# Patient Record
Sex: MALE | Race: WHITE
[De-identification: names, ages, dates, MRNs, and addresses within clinical notes are randomized per-mention and may not be internally consistent; named-entity substitution may affect disease eponyms.]

---

## 2018-02-10 ENCOUNTER — HOSPITAL ENCOUNTER (EMERGENCY)
Dept: HOSPITAL 17 - PHEFT | Age: 83
Discharge: HOME | End: 2018-02-10
Payer: MEDICARE

## 2018-02-10 VITALS
SYSTOLIC BLOOD PRESSURE: 141 MMHG | DIASTOLIC BLOOD PRESSURE: 63 MMHG | OXYGEN SATURATION: 95 % | RESPIRATION RATE: 16 BRPM | TEMPERATURE: 98.4 F | HEART RATE: 92 BPM

## 2018-02-10 VITALS — HEIGHT: 68 IN | BODY MASS INDEX: 23.72 KG/M2 | WEIGHT: 156.53 LBS

## 2018-02-10 DIAGNOSIS — Z79.82: ICD-10-CM

## 2018-02-10 DIAGNOSIS — S42.124A: Primary | ICD-10-CM
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
## 2018-02-10 PROCEDURE — 99283 EMERGENCY DEPT VISIT LOW MDM: CPT

## 2018-02-10 PROCEDURE — 73030 X-RAY EXAM OF SHOULDER: CPT

## 2018-02-10 NOTE — RADRPT
EXAM DATE/TIME:  02/10/2018 10:34 

 

[facility] COMPARISON:     

No previous studies available for comparison.

 

                     

INDICATIONS :     

Fell, right shoulder pain, limited ROM, unable to lift arm

                     

 

MEDICAL HISTORY :            

Polio   

 

SURGICAL HISTORY :     

None.   

 

ENCOUNTER:     

Initial                                        

 

ACUITY:     

1 day      

 

PAIN SCORE:     

5/10

 

LOCATION:     

Right  shoulder

 

FINDINGS:     

Multiple views of the right shoulder demonstrate a nondisplaced fracture involving the acromion. Mode
rate adjacent soft tissue edema. There is normal glenohumeral alignment. The bones are normal in mine
ralization.

 

 

CONCLUSION:     

Nondisplaced fracture of the acromion.

 

 

 

 Lucretia Armstrong MD on February 10, 2018 at 10:59           

Board Certified Radiologist.

 This report was verified electronically.

## 2018-02-10 NOTE — PD
HPI


Chief Complaint:  Injury


Time Seen by Provider:  10:01


Travel History


International Travel<30 days:  No


Contact w/Intl Traveler<30days:  No


Traveled to known affect area:  No





History of Present Illness


HPI


86-year-old male presents to the emergency department for evaluation of right 

shoulder injury that occurred during a fall yesterday morning.  Patient states 

he was getting out of the shower was holding onto the towel bar when the towel 

bar broke causing him to fall.  Patient denies any head injury or LOC.  No neck 

pain or back pain.  No chest pain or abdominal pain.  No nausea, vomiting, 

diarrhea.  He has been ambulatory since the fall.  He denies being on 

anticoagulants.  Movement of the right shoulder will exacerbate pain.  Moderate 

severity.  No alleviating factors.





PFSH


Social History


Alcohol Use:  No


Tobacco Use:  No


Substance Use:  No





Allergies-Medications


(Allergen,Severity, Reaction):  


Coded Allergies:  


     No Known Allergies (Unverified , 2/10/18)


Reported Meds & Prescriptions





Reported Meds & Active Scripts


Active


Reported


Calcium 600 (Calcium Carbonate) 600 Mg Calcium (1500 Mg) Tab 600 Mg PO DAILY


Vitamin D-1000 (Cholecalciferol) 1,000 Unit Tab 1,000 Units PO DAILY


Aspirin EC (Aspirin) 81 Mg Tabdr 81 Mg PO DAILY


Tribenzor (Olmesartan-Amlodipine-Hydrochlorothiazide) 40-10-25 mg Tab 1 Tab PO 

DAILY


Simvastatin 40 Mg Tab 40 Mg PO HS


Omeprazole 20 Mg Tab 20 Mg PO DAILY








Review of Systems


Except as stated in HPI:  all other systems reviewed are Neg





Physical Exam


Narrative


GENERAL: Well-nourished, well-developed elderly male patient, afebrile.


SKIN: Focused skin assessment warm/dry.


HEAD: Normocephalic.  Atraumatic.


ENT: Mucosa pink and moist. No erythema or exudates. No uvular edema. No uvular

, palatal, or tonsillar deviation. Airway patent. Nasal turbinates appear 

normal without nasal blood, purulent drainage or septal hematoma.  Bilateral 

tympanic membranes are clear without erythema or perforation.


EYES: No scleral icterus. No injection or drainage. 


NECK: Supple, trachea midline. No JVD or lymphadenopathy.


CARDIOVASCULAR: Regular rate and rhythm without murmurs, gallops, or rubs.  

Right radial pulses 2+.


RESPIRATORY: Breath sounds equal bilaterally. No accessory muscle use.  Lungs 

sounds are clear to auscultation.


GASTROINTESTINAL: Abdomen soft, non-tender, nondistended. 


MUSCULOSKELETAL: No cyanosis, or edema.  Patient has tenderness over right 

humeral head.  He has reduced range of motion due to pain.


BACK: Nontender without obvious deformity. No CVA tenderness.





Data


Data


Last Documented VS





Vital Signs








  Date Time  Temp Pulse Resp B/P (MAP) Pulse Ox O2 Delivery O2 Flow Rate FiO2


 


2/10/18 09:37 98.4 92 16 141/63 (89) 95   








Orders





 Orders


Shoulder, Complete (>2vws) (2/10/18 09:40)


Acetamin-Hydrocod 325-5 Mg (Norco  5-325 (2/10/18 10:15)








MDM


Medical Decision Making


Medical Screen Exam Complete:  Yes


Emergency Medical Condition:  Yes


Medical Record Reviewed:  Yes


Interpretation(s)


x-ray right shoulder - CONCLUSION:     


Nondisplaced fracture of the acromion.


Differential Diagnosis


Fracture versus dislocation versus contusion versus sprain


Narrative Course


86-year-old presents to the emergency department for evaluation of right 

shoulder injury that occurred yesterday morning when he fell.  X-ray of the 

right shoulder is ordered and pending.  Patient is given Norco 5/325 mg by 

mouth for pain.





X-ray of the right shoulder shows a nondisplaced fracture of the acromion.





Patient is placed in a sling.  He is instructed to follow-up with an 

orthopedist.  He'll be given the name and number for orthopedist on-call today.

  He will be given a short-term prescription for Lortab for pain.  He 

verbalizes agreement and understanding.





The patient was discharged in stable condition with instructions, including 

return instructions and follow up instructions.





Diagnosis





 Primary Impression:  


 Nondisplaced fracture of right acromial process


 Qualified Codes:  S42.124A - Nondisplaced fracture of acromial process, right 

shoulder, initial encounter for closed fracture


Referrals:  


Marycruz Leigh MD


call for appointment





Orthopedist


call for appointment


Patient Instructions:  Arm Fracture in Adults (ED), General Instructions





***Additional Instructions:  


Wear sling.


Ice for 20 minutes 4-5 times daily.


Take Norco as directed as needed for pain.  Caution this can make you drowsy so 

do not drive after taking.


Follow-up with an orthopedist.  Dr. Leigh is our orthopedist on call today.


Return to the emergency department for any acute worsening of symptoms.


***Med/Other Pt SpecificInfo:  Prescription(s) given


Scripts


Hydrocodone-Acetaminophen (Norco) 5 Mg-325 Mg Tab


1 TAB PO Q6H Y for PAIN, #12 TAB 0 Refills


   Prov: Bryanna Hamilton         2/10/18


Disposition:  01 DISCHARGE HOME


Condition:  Stable











Bryanna Hamilton Feb 10, 2018 10:11

## 2018-02-19 ENCOUNTER — HOSPITAL ENCOUNTER (EMERGENCY)
Dept: HOSPITAL 17 - PHEFT | Age: 83
Discharge: HOME | End: 2018-02-19
Payer: MEDICARE

## 2018-02-19 VITALS
OXYGEN SATURATION: 96 % | SYSTOLIC BLOOD PRESSURE: 139 MMHG | RESPIRATION RATE: 17 BRPM | HEART RATE: 79 BPM | TEMPERATURE: 98.5 F | DIASTOLIC BLOOD PRESSURE: 66 MMHG

## 2018-02-19 VITALS — WEIGHT: 156.53 LBS | HEIGHT: 68 IN | BODY MASS INDEX: 23.72 KG/M2

## 2018-02-19 DIAGNOSIS — E78.00: ICD-10-CM

## 2018-02-19 DIAGNOSIS — M25.531: Primary | ICD-10-CM

## 2018-02-19 DIAGNOSIS — I10: ICD-10-CM

## 2018-02-19 DIAGNOSIS — Z86.12: ICD-10-CM

## 2018-02-19 DIAGNOSIS — K21.9: ICD-10-CM

## 2018-02-19 PROCEDURE — 73110 X-RAY EXAM OF WRIST: CPT

## 2018-02-19 PROCEDURE — 99283 EMERGENCY DEPT VISIT LOW MDM: CPT

## 2018-02-19 NOTE — PD
HPI


Chief Complaint:  Pain: Acute or Chronic


Time Seen by Provider:  18:00


Travel History


International Travel<30 days:  No


Contact w/Intl Traveler<30days:  No


Traveled to known affect area:  No





History of Present Illness


HPI


Patient comes to the emergency department complaining of right wrist pain on 

the ulnar aspect described as an irritation.  Patient reports this started 

approximately 2-3 days after wearing a sling from a right shoulder fracture.  

Patient denies anything making this better or worse.  Patient has tried using 

ice and heat with no improvement of symptoms.  Denies any fevers or radiation 

of the pain.  Denies any known trauma.  Denies any numbness or tingling or 

decreased range of motion of the ordinary.  Patient does report some decreased 

range of motion with his right thumb secondary to polio.





PFSH


Past Medical History


High Cholesterol:  Yes


Diminished Hearing:  No


GERD:  Yes


Hypertension:  Yes


Musculoskeletal:  Yes (POLIO)


Pregnant?:  Not Pregnant





Past Surgical History


Abdominal Surgery:  Yes (HERNIA)





Social History


Alcohol Use:  No


Tobacco Use:  No


Substance Use:  No





Allergies-Medications


(Allergen,Severity, Reaction):  


Coded Allergies:  


     No Known Allergies (Unverified , 2/19/18)


Reported Meds & Prescriptions





Reported Meds & Active Scripts


Active


Norco (Hydrocodone-Acetaminophen) 5 Mg-325 Mg Tab 1 Tab PO Q6H PRN


Reported


Calcium 600 (Calcium Carbonate) 600 Mg Calcium (1500 Mg) Tab 600 Mg PO DAILY


Vitamin D-1000 (Cholecalciferol) 1,000 Unit Tab 1,000 Units PO DAILY


Aspirin EC (Aspirin) 81 Mg Tabdr 81 Mg PO DAILY


Tribenzor (Olmesartan-Amlodipine-Hydrochlorothiazide) 40-10-25 mg Tab 1 Tab PO 

DAILY


Simvastatin 40 Mg Tab 40 Mg PO HS


Omeprazole 20 Mg Tab 20 Mg PO DAILY








Review of Systems


Except as stated in HPI:  all other systems reviewed are Neg





Physical Exam


Narrative


GENERAL: Well-developed, well nourished, in no acute distress, and non-ill 

appearing.


SKIN: Focused skin assessment warm and dry.  Patient reports tenderness 

palpation over ulnar aspect of the right wrist.  Neurovascularly intact 

distally.  Reports range of motion is normal for him.  Capillary refill less 

than 2 seconds.  No crepitus, induration, or erythematous.  No signs of 

infection.


HEAD: Atraumatic. Normocephalic. 


EYES: Pupils equal and round. EOMI. No scleral icterus. No injection or 

drainage. 


ENT: No nasal bleeding or discharge.  Mucous membranes pink and moist.


NECK: Trachea midline. Supple.  No nuclear rigidity.


CARDIOVASCULAR: Radial pulses 2+, intact, and equal bilaterally.  Capillary 

refill less than 2 seconds.


RESPIRATORY: No accessory muscle use.  No respiratory distress. 


MUSCULOSKELETAL: No obvious deformities. No clubbing.  No cyanosis.  No edema.  

Decreased range of motion right shoulder secondary to recent fracture.


NEUROLOGICAL: Awake and alert. No obvious cranial nerve deficits.  Motor 

grossly within normal limits. Normal speech.


PSYCHIATRIC: Appropriate mood and affect; insight and judgment normal.





Data


Data


Last Documented VS





Vital Signs








  Date Time  Temp Pulse Resp B/P (MAP) Pulse Ox O2 Delivery O2 Flow Rate FiO2


 


2/19/18 17:39 98.5 79 17 139/66 (90) 96   








Orders





 Orders


Wrist, Complete ([id number]) (2/19/18 )


Ed Discharge Order (2/19/18 19:16)








MDM


Medical Decision Making


Medical Screen Exam Complete:  Yes


Emergency Medical Condition:  Yes


Interpretation(s)





Last Impressions








Wrist X-Ray 2/19/18 0000 Signed





Impressions: 





 Service Date/Time:  Monday, February 19, 2018 18:37 - CONCLUSION:  Arthritic 





 changes and mild erosions involving the wrist as described. No acute bony 





 findings     Daniel Rock MD 








Differential Diagnosis


Fracture, strain, contusion, irritation


Narrative Course


There is no clinical evidence for fracture. There is no clinical evidence to 

suspect bony injury by exam. Radiographic examination revealed no fracture seen 

at this time. No obvious ligamental injury or internal derangement is noted at 

this time. The distal extremity appears neurovascularly intact, without 

evidence of neurovascular injury nor compartment syndrome. Tendon exam also was 

intact. The patient was discharged and given warnings for vascular compromise. 

The patient is to follow up with primary care provider and/or orthopedics. The 

patient agrees with plan.





Patient in no obvious distress upon re-evaluation. All pertinent Radiology 

result(s) discussed with patient/family. Any questions/concerns in reference to 

patient diagnosis/condition discussed and clarified prior to patient's 

discharge. Reinforced sheer importance of close follow up with patient's 

primary physician or primary care clinic and/or orthopedic. Instructed patient 

to return to ED immediately, if symptoms return/worsen. Patient showed 

understanding of above instructions.  Further instructions and recommendations 

were detailed in discharge paperwork.  Patient ambulated without difficulty out 

of ED at discharge.





Diagnosis





 Primary Impression:  


 Right wrist pain


Patient Instructions:  Arthralgia (ED), General Instructions





***Additional Instructions:  


Follow-up with your primary care physician and/or orthopedics this week for 

reevaluation.  Put some padding between your wrist and the sling.  Return to 

the emergency department if symptoms get worse.


Disposition:  01 DISCHARGE HOME


Condition:  Stable











Jeffrey Ayala Feb 19, 2018 18:07

## 2018-02-19 NOTE — RADRPT
EXAM DATE/TIME:  02/19/2018 18:37 

 

[facility] COMPARISON:     

No previous studies available for comparison.

 

                     

INDICATIONS :     

Complains of right wrist pain and swelling.  Patient states hurt right shoulder last week and now com
plains of wrist pain.

                     

 

MEDICAL HISTORY :     

None.          

 

SURGICAL HISTORY :     

None.   

 

ENCOUNTER:     

Initial                                        

 

ACUITY:     

1 week      

 

PAIN SCORE:     

8/10

 

LOCATION:     

Right  wrist

 

FINDINGS:     

Chondrocalcinosis is noted. There is a tiny erosion involving the tip of the ulnar styloid and sclero
tiana change in the lunate at the scapholunate articulation. The bony elements are otherwise intact. T
here is no evidence of fracture or dislocation.

 

CONCLUSION:     

Arthritic changes and mild erosions involving the wrist as described. No acute bony findings

 

 

 

 Daniel Rock MD on February 19, 2018 at 19:09           

Board Certified Radiologist.

 This report was verified electronically.

## 2018-02-24 ENCOUNTER — HOSPITAL ENCOUNTER (INPATIENT)
Dept: HOSPITAL 17 - PHED | Age: 83
LOS: 3 days | Discharge: HOME HEALTH | DRG: 603 | End: 2018-02-27
Attending: HOSPITALIST | Admitting: HOSPITALIST
Payer: MEDICARE

## 2018-02-24 VITALS
RESPIRATION RATE: 18 BRPM | SYSTOLIC BLOOD PRESSURE: 137 MMHG | HEART RATE: 83 BPM | OXYGEN SATURATION: 96 % | DIASTOLIC BLOOD PRESSURE: 62 MMHG

## 2018-02-24 VITALS
SYSTOLIC BLOOD PRESSURE: 157 MMHG | TEMPERATURE: 98.1 F | OXYGEN SATURATION: 96 % | HEART RATE: 90 BPM | RESPIRATION RATE: 16 BRPM | DIASTOLIC BLOOD PRESSURE: 67 MMHG

## 2018-02-24 VITALS — OXYGEN SATURATION: 96 %

## 2018-02-24 VITALS — OXYGEN SATURATION: 93 %

## 2018-02-24 VITALS — HEIGHT: 68 IN | BODY MASS INDEX: 23.36 KG/M2 | WEIGHT: 154.1 LBS

## 2018-02-24 VITALS
DIASTOLIC BLOOD PRESSURE: 66 MMHG | TEMPERATURE: 97 F | OXYGEN SATURATION: 96 % | RESPIRATION RATE: 18 BRPM | SYSTOLIC BLOOD PRESSURE: 137 MMHG | HEART RATE: 80 BPM

## 2018-02-24 VITALS
TEMPERATURE: 98.4 F | OXYGEN SATURATION: 93 % | HEART RATE: 79 BPM | RESPIRATION RATE: 20 BRPM | SYSTOLIC BLOOD PRESSURE: 130 MMHG | DIASTOLIC BLOOD PRESSURE: 62 MMHG

## 2018-02-24 VITALS
HEART RATE: 75 BPM | SYSTOLIC BLOOD PRESSURE: 112 MMHG | DIASTOLIC BLOOD PRESSURE: 73 MMHG | TEMPERATURE: 98.1 F | RESPIRATION RATE: 16 BRPM | OXYGEN SATURATION: 95 %

## 2018-02-24 VITALS — HEART RATE: 80 BPM

## 2018-02-24 DIAGNOSIS — Z86.12: ICD-10-CM

## 2018-02-24 DIAGNOSIS — E78.5: ICD-10-CM

## 2018-02-24 DIAGNOSIS — K21.9: ICD-10-CM

## 2018-02-24 DIAGNOSIS — L03.031: Primary | ICD-10-CM

## 2018-02-24 DIAGNOSIS — Z86.19: ICD-10-CM

## 2018-02-24 DIAGNOSIS — I10: ICD-10-CM

## 2018-02-24 LAB
BASOPHILS # BLD AUTO: 0.1 TH/MM3 (ref 0–0.2)
BASOPHILS NFR BLD: 0.4 % (ref 0–2)
BUN SERPL-MCNC: 20 MG/DL (ref 7–18)
CALCIUM SERPL-MCNC: 9.3 MG/DL (ref 8.5–10.1)
CHLORIDE SERPL-SCNC: 102 MEQ/L (ref 98–107)
CREAT SERPL-MCNC: 1.2 MG/DL (ref 0.6–1.3)
EOSINOPHIL # BLD: 0.1 TH/MM3 (ref 0–0.4)
EOSINOPHIL NFR BLD: 0.8 % (ref 0–4)
ERYTHROCYTE [DISTWIDTH] IN BLOOD BY AUTOMATED COUNT: 12.2 % (ref 11.6–17.2)
GFR SERPLBLD BASED ON 1.73 SQ M-ARVRAT: 57 ML/MIN (ref 89–?)
GLUCOSE SERPL-MCNC: 119 MG/DL (ref 74–106)
HCO3 BLD-SCNC: 23.2 MEQ/L (ref 21–32)
HCT VFR BLD CALC: 40.5 % (ref 39–51)
HGB BLD-MCNC: 13.6 GM/DL (ref 13–17)
LYMPHOCYTES # BLD AUTO: 2.3 TH/MM3 (ref 1–4.8)
LYMPHOCYTES NFR BLD AUTO: 14.2 % (ref 9–44)
MCH RBC QN AUTO: 29.1 PG (ref 27–34)
MCHC RBC AUTO-ENTMCNC: 33.5 % (ref 32–36)
MCV RBC AUTO: 86.7 FL (ref 80–100)
MONOCYTE #: 1.3 TH/MM3 (ref 0–0.9)
MONOCYTES NFR BLD: 8.3 % (ref 0–8)
NEUTROPHILS # BLD AUTO: 12.2 TH/MM3 (ref 1.8–7.7)
NEUTROPHILS NFR BLD AUTO: 76.3 % (ref 16–70)
PLATELET # BLD: 273 TH/MM3 (ref 150–450)
PMV BLD AUTO: 7.7 FL (ref 7–11)
RBC # BLD AUTO: 4.67 MIL/MM3 (ref 4.5–5.9)
SODIUM SERPL-SCNC: 136 MEQ/L (ref 136–145)
WBC # BLD AUTO: 16 TH/MM3 (ref 4–11)

## 2018-02-24 PROCEDURE — 83036 HEMOGLOBIN GLYCOSYLATED A1C: CPT

## 2018-02-24 PROCEDURE — 96372 THER/PROPH/DIAG INJ SC/IM: CPT

## 2018-02-24 PROCEDURE — 96365 THER/PROPH/DIAG IV INF INIT: CPT

## 2018-02-24 PROCEDURE — 85025 COMPLETE CBC W/AUTO DIFF WBC: CPT

## 2018-02-24 PROCEDURE — G0378 HOSPITAL OBSERVATION PER HR: HCPCS

## 2018-02-24 PROCEDURE — 73720 MRI LWR EXTREMITY W/O&W/DYE: CPT

## 2018-02-24 PROCEDURE — 87185 SC STD ENZYME DETCJ PER NZM: CPT

## 2018-02-24 PROCEDURE — 87205 SMEAR GRAM STAIN: CPT

## 2018-02-24 PROCEDURE — 96375 TX/PRO/DX INJ NEW DRUG ADDON: CPT

## 2018-02-24 PROCEDURE — 87077 CULTURE AEROBIC IDENTIFY: CPT

## 2018-02-24 PROCEDURE — A9579 GAD-BASE MR CONTRAST NOS,1ML: HCPCS

## 2018-02-24 PROCEDURE — 82948 REAGENT STRIP/BLOOD GLUCOSE: CPT

## 2018-02-24 PROCEDURE — 80048 BASIC METABOLIC PNL TOTAL CA: CPT

## 2018-02-24 PROCEDURE — 87070 CULTURE OTHR SPECIMN AEROBIC: CPT

## 2018-02-24 PROCEDURE — 83605 ASSAY OF LACTIC ACID: CPT

## 2018-02-24 PROCEDURE — L3260 AMBULATORY SURGICAL BOOT EAC: HCPCS

## 2018-02-24 PROCEDURE — 87186 SC STD MICRODIL/AGAR DIL: CPT

## 2018-02-24 PROCEDURE — 73630 X-RAY EXAM OF FOOT: CPT

## 2018-02-24 PROCEDURE — 82565 ASSAY OF CREATININE: CPT

## 2018-02-24 RX ADMIN — HYDROCODONE BITARTRATE AND ACETAMINOPHEN PRN TAB: 5; 325 TABLET ORAL at 22:48

## 2018-02-24 RX ADMIN — ENOXAPARIN SODIUM SCH MG: 40 INJECTION SUBCUTANEOUS at 15:13

## 2018-02-24 RX ADMIN — HYDROCODONE BITARTRATE AND ACETAMINOPHEN PRN TAB: 5; 325 TABLET ORAL at 16:48

## 2018-02-24 RX ADMIN — STANDARDIZED SENNA CONCENTRATE AND DOCUSATE SODIUM SCH TAB: 8.6; 5 TABLET, FILM COATED ORAL at 21:00

## 2018-02-24 RX ADMIN — PRAVASTATIN SODIUM SCH MG: 80 TABLET ORAL at 21:53

## 2018-02-24 RX ADMIN — INSULIN ASPART SCH: 100 INJECTION, SOLUTION INTRAVENOUS; SUBCUTANEOUS at 21:00

## 2018-02-24 NOTE — PD
HPI


.


Skin infection


Chief Complaint:  Skin Problem


Time Seen by Provider:  11:13


Travel History


International Travel<30 days:  No


Contact w/Intl Traveler<30days:  No


Traveled to known affect area:  No





History of Present Illness


HPI


This patient presents with a chief complaint of infected right fourth and fifth 

toes.  He states that his symptoms all started about 3 weeks ago with some mild 

discomfort between his toes.  He called his primary care doctor who is in 

another state no stem presumptively with athlete's foot and put him on an 

antifungal cream.  He used that for a while but his symptoms got worse rather 

than better.  He was started on doxycycline 3 days ago for cellulitis.  Despite 

that his symptoms have gotten worse rather than better since that time causing 

him to present to us today.  He reports no systemic symptoms such as fever or 

nausea.  He does report some localized swelling.  He reports mild pain which 

she rates 2/10.





PFSH


Past Medical History


High Cholesterol:  Yes


Diminished Hearing:  No


GERD:  Yes


Hypertension:  Yes


Musculoskeletal:  Yes (POLIO)


Tetanus Vaccination:  < 5 Years


Influenza Vaccination:  Yes


Pregnant?:  Not Pregnant





Past Surgical History


Abdominal Surgery:  Yes (HERNIA)





Social History


Alcohol Use:  No


Tobacco Use:  No


Substance Use:  No





Allergies-Medications


(Allergen,Severity, Reaction):  


Coded Allergies:  


     No Known Allergies (Unverified , 2/24/18)


Reported Meds & Prescriptions





Reported Meds & Active Scripts


Active


Reported


Doxycycline Hyclate 100 Mg Cap 100 Mg PO BID


Calcium 600 (Calcium Carbonate) 600 Mg Calcium (1500 Mg) Tab 600 Mg PO DAILY


Vitamin D-1000 (Cholecalciferol) 1,000 Unit Tab 1,000 Units PO DAILY


Aspirin EC (Aspirin) 81 Mg Tabdr 81 Mg PO DAILY


Tribenzor (Olmesartan-Amlodipine-Hydrochlorothiazide) 40-10-25 mg Tab 1 Tab PO 

DAILY


Simvastatin 40 Mg Tab 40 Mg PO HS


Omeprazole 20 Mg Tab 20 Mg PO DAILY








Review of Systems


Except as stated in HPI:  all other systems reviewed are Neg


General / Constitutional:  No: Fever, Chills


Skin:  Positive Change in Pigmentation





Physical Exam


Narrative


GENERAL: Awake and alert and in no acute distress.


SKIN: Warm and dry.  He has denuded tissue between the 3rd & 4th and 4th and 

5th toes of the right foot.  There appears to be some purulent drainage.  There 

is localized tenderness.


HEAD: Normocephalic/atraumatic.


EYES: Pupils are equal.  Extraocular movements are intact.


NECK: Normal range of motion.


RESPIRATORY: Nonlabored respirations.


MUSCULOSKELETAL: Atraumatic.


NEUROLOGICAL: Nonfocal.


PSYCHIATRIC: Appropriate mood and affect.





Data


Data


Last Documented VS





Vital Signs








  Date Time  Temp Pulse Resp B/P (MAP) Pulse Ox O2 Delivery O2 Flow Rate FiO2


 


2/24/18 12:22  83 18 137/62 (87) 96 Room Air  


 


2/24/18 09:15 98.1       








Orders





 Orders


Foot, Complete ([id number]) (2/24/18 )


Complete Blood Count With Diff (2/24/18 11:13)


Lactic Acid Sepsis Protocol (2/24/18 11:13)


Wound Culture And Gram Stain (2/24/18 11:13)


Basic Metabolic Panel (Bmp) (2/24/18 11:13)


Iv Access Insert/Monitor (2/24/18 11:13)


Vancomycin Inj (Vancomycin Inj) (2/24/18 11:45)


Admit Order (Ed Use Only) (2/24/18 )


Vital Signs (Adult) Q4H (2/24/18 13:25)


Diet Heart Healthy (2/24/18 Lunch)


Activity Oob With Assistance (2/24/18 13:25)


Notify Dr: Other (2/24/18 13:25)





Labs





Laboratory Tests








Test


  2/24/18


11:25


 


White Blood Count 16.0 TH/MM3 


 


Red Blood Count 4.67 MIL/MM3 


 


Hemoglobin 13.6 GM/DL 


 


Hematocrit 40.5 % 


 


Mean Corpuscular Volume 86.7 FL 


 


Mean Corpuscular Hemoglobin 29.1 PG 


 


Mean Corpuscular Hemoglobin


Concent 33.5 % 


 


 


Red Cell Distribution Width 12.2 % 


 


Platelet Count 273 TH/MM3 


 


Mean Platelet Volume 7.7 FL 


 


Neutrophils (%) (Auto) 76.3 % 


 


Lymphocytes (%) (Auto) 14.2 % 


 


Monocytes (%) (Auto) 8.3 % 


 


Eosinophils (%) (Auto) 0.8 % 


 


Basophils (%) (Auto) 0.4 % 


 


Neutrophils # (Auto) 12.2 TH/MM3 


 


Lymphocytes # (Auto) 2.3 TH/MM3 


 


Monocytes # (Auto) 1.3 TH/MM3 


 


Eosinophils # (Auto) 0.1 TH/MM3 


 


Basophils # (Auto) 0.1 TH/MM3 


 


CBC Comment DIFF FINAL 


 


Differential Comment  


 


Blood Urea Nitrogen 20 MG/DL 


 


Creatinine 1.20 MG/DL 


 


Random Glucose 119 MG/DL 


 


Calcium Level 9.3 MG/DL 


 


Sodium Level 136 MEQ/L 


 


Potassium Level 3.7 MEQ/L 


 


Chloride Level 102 MEQ/L 


 


Carbon Dioxide Level 23.2 MEQ/L 


 


Anion Gap 11 MEQ/L 


 


Estimat Glomerular Filtration


Rate 57 ML/MIN 


 


 


Lactic Acid Level 2.0 mmol/L 











MDM


Medical Decision Making


Medical Screen Exam Complete:  Yes


Emergency Medical Condition:  Yes


Medical Record Reviewed:  Yes (the patient has only had sporadic visits to us 

mainly for orthopedic injuries.)


Differential Diagnosis


My differential diagnosis includes but is not limited to localized wound 

infection, cellulitis, abscess


Narrative Course


This patient presents with worsening cellulitis of the right fourth and fifth 

toes.  His symptoms have been ongoing for 3 weeks and were initially attributed 

to athlete's foot.  He was started on antibiotics 3 days ago but has gotten 

worse rather than better.  I will give him a dose of vancomycin.  An x-ray of 

the foot is pending to look for osteomyelitis.  CBC and lactic acid are also 

pending to rule out systemic infection.





CBC & BMP Diagram


2/24/18 11:25








Calcium Level 9.3





LA 2.0








Last Impressions








Foot X-Ray 2/24/18 0000 Signed





Impressions: 





 Service Date/Time:  Saturday, February 24, 2018 11:43 - CONCLUSION:  





 Unremarkable exam for patient's age.     Sylvester Mares MD 








This patient will be admitted for failed outpatient therapy of cellulitis.





Physician Communication


Physician Communication


Dr. Castillo





Diagnosis





 Primary Impression:  


 Cellulitis


 Qualified Codes:  L03.031 - Cellulitis of right toe





Admitting Information


Admitting Physician Requests:  Admit


Condition:  Stable











Pinky Quinones MD Feb 24, 2018 12:02

## 2018-02-24 NOTE — RADRPT
EXAM DATE/TIME:  02/24/2018 11:43 

 

[facility] COMPARISON:     

No previous studies available for comparison.

 

                     

INDICATIONS :     

Complains of pain and redness of 4th and 5th digits of right foot.

                     

 

MEDICAL HISTORY :     

None.          

 

SURGICAL HISTORY :     

None.   

 

ENCOUNTER:     

Initial                                        

 

ACUITY:     

3 days      

 

PAIN SCORE:     

4/10

 

LOCATION:     

Right  foot, 4th and 5th digit

 

FINDINGS:     

Three view examination of the right foot demonstrates no soft tissue swelling, dislocation, or fractu
re.   The tarsal bones appear intact.  The interphalangeal and metatarsophalangeal joints are intact.
  The calcaneus is intact.  Bony mineralization is osteopenic. No foreign bodies are demonstrated.

 

CONCLUSION:     

Unremarkable exam for patient's age.

 

 

 

 Sylvester Mares MD on February 24, 2018 at 11:58           

Board Certified Radiologist.

 This report was verified electronically.

## 2018-02-24 NOTE — HHI.HP
__________________________________________________





[facility]


Service


[facility]


Primary Care Physician


Non-Staff


Admission Diagnosis


Cellulitis, right 4th and 5th toes


Diagnoses:  


(1) Cellulitis


Diagnosis:  Principal





Chief Complaint:  


Right third and fourth and fifth digit toe cellulitis


Failed outpatient antibiotics


Travel History


International Travel<30 Days:  No


Contact w/Intl Traveler <30 Da:  No


Traveled to Known Affected Are:  No


History of Present Illness


Written by Marilyn Canela, acting as scribe for Dr. Castillo on 18 at 18:18. 


This is a 86-year-old male patient with a known medical history of 

hyperlipidemia, hypertension, GERD and polio who presented to the ED with right 

third, fourth and fifth digit cellulitis failed outpatient antibiotic 

treatment.  Patient states that his symptoms started roughly 3 weeks ago with 

some mild discomfort between his toes, called his primary care doctor who 

started him on an antifungal cream.  For a week or so the symptoms continued to 

persist and even worsen which brought him to the podiatrist who started him on 

doxycycline 3 days ago for cellulitis.  Patient states his toes have worsened 

which led to his presentation.  Patient denies any recent illness including 

fever, chills, cough, shortness of breath, abdominal pain, nausea, vomiting, 

diarrhea or dysuria.  Patient's denies any pain.  Does have a history of polio 

and left leg with history of surgery, recent fall breaking his right acromion 

process which did not require surgery.





Review of Systems


Constitutional:  DENIES: Fatigue, Fever, Chills


Eyes:  DENIES: Blurred vision, Diplopia


Respiratory:  DENIES: Cough, Shortness of breath


Cardiovascular:  DENIES: Chest pain, Palpitations


Gastrointestinal:  DENIES: Abdominal pain, Black stools


Musculoskeletal:  DENIES: Joint pain


Neurologic:  DENIES: Abnormal gait


Psychiatric:  DENIES: Anxiety


Except as stated in HPI:  all other systems reviewed are Neg





Past Family Social History


Past Medical History


Hypertension


Hyperlipidemia


Polio


GERD


Past Surgical History


Hernia repair


Liver biopsy for history of hepatitis B


Ankle fusion


Left leg polio fusion 1946


Reported Medications


Active


Reported


Doxycycline Hyclate 100 Mg Cap 100 Mg PO BID


Calcium 600 (Calcium Carbonate) 600 Mg Calcium (1500 Mg) Tab 600 Mg PO DAILY


Vitamin D-1000 (Cholecalciferol) 1,000 Unit Tab 1,000 Units PO DAILY


Aspirin EC (Aspirin) 81 Mg Tabdr 81 Mg PO DAILY


Tribenzor (Olmesartan-Amlodipine-Hydrochlorothiazide) 40-10-25 mg Tab 1 Tab PO 

DAILY


Simvastatin 40 Mg Tab 40 Mg PO HS


Omeprazole 20 Mg Tab 20 Mg PO DAILY


Allergies:  


Coded Allergies:  


     No Known Allergies (Unverified , 18)


Active Ordered Medications





Current Medications








 Medications


  (Trade)  Dose


 Ordered  Sig/Dara


 Route  Start Time


 Stop Time Status Last Admin


 


 Pharmacy Profile


 Note  0 ml @ 0


 mls/hr  UNSCH


 OTHER  18 13:30


     


 


 


  (Tylenol)  650 mg  Q4H  PRN


 PO  18 13:30


     


 


 


  (Zofran Inj)  4 mg  Q6H  PRN


 IVP  18 13:30


     


 


 


  (Lovenox Inj)  40 mg  Q24H


 SQ  18 14:00


    18 15:13


 


 


  (Norco  5-325 Mg)  1 tab  Q4H  PRN


 PO  18 13:30


    18 16:48


 


 


  (Norco  7.5-325


 Mg)  1 tab  Q4H  PRN


 PO  18 13:30


     


 


 


  (Mary Kay-Colace)  1 tab  BID


 PO  18 21:00


     


 


 


  (Milk Of


 Magnesia Liq)  30 ml  Q12H  PRN


 PO  18 13:30


     


 


 


 Vancomycin HCl


 1000 mg/Sodium


 Chloride  250 ml @ 


 250 mls/hr  Q24H


 IV  18 12:00


     


 


 


 Miscellaneous


 Information  SPECIFIC


 LAB TO BE


 [facility]...  ONCE  ONCE


 .XX  18 11:45


 18 11:46   


 


 


  (NovoLOG


 SUPPLEMENTAL


 SCALE)  1  ACHS SLIDING  SCALE


 SQ  18 21:00


   UNV  


 


 


  (Glucagon Inj)  1 mg  UNSCH  PRN


 OTHER  18 18:15


   UNV  


 


 


  (D50w (Vial) Inj)  50 ml  UNSCH  PRN


 IV PUSH  18 18:15


   UNV  


 








Family History


Maternal history significant for stroke.


Social History


Denies any history of tobacco use, alcohol or illicit drug use.





Physical Exam


Vital Signs





Vital Signs








  Date Time  Temp Pulse Resp B/P (MAP) Pulse Ox O2 Delivery O2 Flow Rate FiO2


 


18 18:08   18     


 


18 14:38     96   21


 


18 14:25 97.0 80 18 137/66 (89) 96   


 


18 14:06        


 


18 12:22  83 18 137/62 (87) 96 Room Air  


 


18 09:15 98.1 90 16 157/67 (97) 96   








Physical Exam


GENERAL: Well-developed, well-nourished patient in NAD.


SKIN: Warm and dry. No rash.  Right third, fourth and fifth digit and 

maceration and webspace, erythema.


HEAD:  Normocephalic. Atraumatic.


EYES: Pupils equal and round. No scleral icterus. No injection or drainage. 


ENT: No nasal bleeding or discharge.  Mucous membranes pink and moist.


NECK: Supple. Trachea midline.  


CARDIOVASCULAR: Regular rate and rhythm.  S1, S2 noted. No murmur appreciated. 


RESPIRATORY: No accessory muscle use. Clear to auscultation. Breath sounds 

equal bilaterally.  


GASTROINTESTINAL: Abdomen soft, non-tender, nondistended. Normoactive bowel 

sounds x4.


NEUROLOGICAL: Awake and alert. No obvious cranial nerve deficits.  Motor 

grossly within normal limits. 5/5 muscle strength in bilateral upper and lower 

extremities.  Normal speech.


PSYCHIATRIC: Appropriate mood and affect; insight and judgment normal.


Laboratory





Laboratory Tests








Test


  18


11:25


 


White Blood Count 16.0 


 


Red Blood Count 4.67 


 


Hemoglobin 13.6 


 


Hematocrit 40.5 


 


Mean Corpuscular Volume 86.7 


 


Mean Corpuscular Hemoglobin 29.1 


 


Mean Corpuscular Hemoglobin


Concent 33.5 


 


 


Red Cell Distribution Width 12.2 


 


Platelet Count 273 


 


Mean Platelet Volume 7.7 


 


Neutrophils (%) (Auto) 76.3 


 


Lymphocytes (%) (Auto) 14.2 


 


Monocytes (%) (Auto) 8.3 


 


Eosinophils (%) (Auto) 0.8 


 


Basophils (%) (Auto) 0.4 


 


Neutrophils # (Auto) 12.2 


 


Lymphocytes # (Auto) 2.3 


 


Monocytes # (Auto) 1.3 


 


Eosinophils # (Auto) 0.1 


 


Basophils # (Auto) 0.1 


 


CBC Comment DIFF FINAL 


 


Differential Comment  


 


Blood Urea Nitrogen 20 


 


Creatinine 1.20 


 


Random Glucose 119 


 


Calcium Level 9.3 


 


Sodium Level 136 


 


Potassium Level 3.7 


 


Chloride Level 102 


 


Carbon Dioxide Level 23.2 


 


Anion Gap 11 


 


Estimat Glomerular Filtration


Rate 57 


 


 


Lactic Acid Level 2.0 














 Date/Time


Source Procedure


Growth Status


 


 


 18 11:23


Wound Foot Gram Stain


Pending Received


 


 18 11:23


Wound Foot Wound Culture


Pending Received








Result Diagram:  


18 1125                                                                   

             18 1125





Imaging





Last Impressions








Foot X-Ray 18 0000 Signed





Impressions: 





 Service Date/Time:  2018 11:43 - CONCLUSION:  





 Unremarkable exam for patient's age.     Sylvester Mares MD 











Septic Shock Reassessment


Septic shock perfusion:  reassessment completed





Caprini VTE Risk Assessment


Caprini VTE Risk Assessment:  Mod/High Risk (score >= 2)


Caprini Risk Assessment Model











 Point Value = 1          Point Value = 2  Point Value = 3  Point Value = 5


 


Age 41-60


Minor surgery


BMI > 25 kg/m2


Swollen legs


Varicose veins


Pregnancy or postpartum


History of unexplained or recurrent


   spontaneous 


Oral contraceptives or hormone


   replacement


Sepsis (< 1 month)


Serious lung disease, including


   pneumonia (< 1 month)


Abnormal pulmonary function


Acute myocardial infarction


Congestive heart failure (< 1 month)


History of inflammatory bowel disease


Medical patient at bed rest Age 61-74


Arthroscopic surgery


Major open surgery (> 45 min)


Laparoscopic surgery (> 45 min)


Malignancy


Confined to bed (> 72 hours)


Immobilizing plaster cast


Central venous access Age >= 75


History of VTE


Family history of VTE


Factor V Leiden


Prothrombin 20347U


Lupus anticoagulant


Anticardiolipin antibodies


Elevated serum homocysteine


Heparin-induced thrombocytopenia


Other congenital or acquired


   thrombophilia Stroke (< 1 month)


Elective arthroplasty


Hip, pelvis, or leg fracture


Acute spinal cord injury (< 1 month)








Prophylaxis Regimen











   Total Risk


Factor Score Risk Level Prophylaxis Regimen


 


0-1      Low Early ambulation


 


2 Moderate Order ONE of the following:


*Sequential Compression Device (SCD)


*Heparin 5000 units SQ BID


 


3-4 Higher Order ONE of the following medications:


*Heparin 5000 units SQ TID


*Enoxaparin/Lovenox 40 mg SQ daily (WT < 150 kg, CrCl > 30 mL/min)


*Enoxaparin/Lovenox 30 mg SQ daily (WT < 150 kg, CrCl > 10-29 mL/min)


*Enoxaparin/Lovenox 30 mg SQ BID (WT < 150 kg, CrCl > 30 mL/min)


AND/OR


*Sequential Compression Device (SCD)


 


5 or more Highest Order ONE of the following medications:


*Heparin 5000 units SQ TID (Preferred with Epidurals)


*Enoxaparin/Lovenox 40 mg SQ daily (WT < 150 kg, CrCl > 30 mL/min)


*Enoxaparin/Lovenox 30 mg SQ daily (WT < 150 kg, CrCl > 10-29 mL/min)


*Enoxaparin/Lovenox 30 mg SQ BID (WT < 150 kg, CrCl > 30 mL/min)


AND


*Sequential Compression Device (SCD)











Assessment and Plan


Assessment and Plan


This is a 86-year-old male patient with a known medical history of 

hyperlipidemia, hypertension, GERD and polio who presented to the ED with right 

third, fourth and fifth digit cellulitis failed outpatient antibiotic treatment.





Cellulitis of the right third, fourth and fifth digit toes


Failed outpatient antibiotic therapy


   With leukocytosis, white blood cell 16.  Lactic acid 2.0.  Wound culture 

ordered and pending.  Monitor for infection.  Afebrile.  Vital signs are stable.


   Right foot x-ray reviewed showing unremarkable exam.


   Podiatry consulted, appreciate further input and recommendations.


   Started on vancomycin IV.  Pharmacy to dose.


   Control pain, Norco available p.o. for pain scale.





Hypertension, chronic: Monitor BP trends.  Continue p.o. home medications.





Hyperlipidemia, chronic: Continue home statin.





DVT prophylaxis: SCDs.  Lovenox.








This note was transcribed by scribclemente [[email or web address]].  I, Dr. Dmitriy Castillo 

personally performed the history, physical exam, and medical decision making; 

and confirmed the accuracy of the information in the transcribed note.





Authenticated by Dr. Dmitriy Castillo on 18 at 18:44.





Problem Qualifiers





(1) Cellulitis:  


Qualified Codes:  L03.031 - Cellulitis of right toe








Marilyn Canela 2018 18:27


Dmitriy Castillo MD 2018 18:44

## 2018-02-25 VITALS
HEART RATE: 84 BPM | DIASTOLIC BLOOD PRESSURE: 70 MMHG | SYSTOLIC BLOOD PRESSURE: 152 MMHG | OXYGEN SATURATION: 94 % | TEMPERATURE: 96.3 F | RESPIRATION RATE: 20 BRPM

## 2018-02-25 VITALS
RESPIRATION RATE: 20 BRPM | TEMPERATURE: 98.1 F | OXYGEN SATURATION: 95 % | SYSTOLIC BLOOD PRESSURE: 142 MMHG | DIASTOLIC BLOOD PRESSURE: 62 MMHG | HEART RATE: 82 BPM

## 2018-02-25 VITALS
OXYGEN SATURATION: 95 % | DIASTOLIC BLOOD PRESSURE: 62 MMHG | SYSTOLIC BLOOD PRESSURE: 99 MMHG | HEART RATE: 66 BPM | RESPIRATION RATE: 16 BRPM | TEMPERATURE: 97.9 F

## 2018-02-25 VITALS
HEART RATE: 84 BPM | TEMPERATURE: 97.5 F | OXYGEN SATURATION: 93 % | RESPIRATION RATE: 16 BRPM | SYSTOLIC BLOOD PRESSURE: 137 MMHG | DIASTOLIC BLOOD PRESSURE: 83 MMHG

## 2018-02-25 VITALS
TEMPERATURE: 98.2 F | DIASTOLIC BLOOD PRESSURE: 63 MMHG | OXYGEN SATURATION: 94 % | SYSTOLIC BLOOD PRESSURE: 105 MMHG | RESPIRATION RATE: 18 BRPM | HEART RATE: 69 BPM

## 2018-02-25 VITALS — OXYGEN SATURATION: 94 %

## 2018-02-25 VITALS
HEART RATE: 77 BPM | RESPIRATION RATE: 20 BRPM | SYSTOLIC BLOOD PRESSURE: 117 MMHG | TEMPERATURE: 98.2 F | OXYGEN SATURATION: 94 % | DIASTOLIC BLOOD PRESSURE: 56 MMHG

## 2018-02-25 VITALS — RESPIRATION RATE: 17 BRPM

## 2018-02-25 LAB
BASOPHILS # BLD AUTO: 0 TH/MM3 (ref 0–0.2)
BASOPHILS NFR BLD: 0.2 % (ref 0–2)
BUN SERPL-MCNC: 22 MG/DL (ref 7–18)
CALCIUM SERPL-MCNC: 9 MG/DL (ref 8.5–10.1)
CHLORIDE SERPL-SCNC: 101 MEQ/L (ref 98–107)
CREAT SERPL-MCNC: 1.1 MG/DL (ref 0.6–1.3)
EOSINOPHIL # BLD: 0.1 TH/MM3 (ref 0–0.4)
EOSINOPHIL NFR BLD: 0.8 % (ref 0–4)
ERYTHROCYTE [DISTWIDTH] IN BLOOD BY AUTOMATED COUNT: 12.4 % (ref 11.6–17.2)
GFR SERPLBLD BASED ON 1.73 SQ M-ARVRAT: 63 ML/MIN (ref 89–?)
GLUCOSE SERPL-MCNC: 97 MG/DL (ref 74–106)
HBA1C MFR BLD: 5.5 % (ref 4.3–6)
HCO3 BLD-SCNC: 24.2 MEQ/L (ref 21–32)
HCT VFR BLD CALC: 39.2 % (ref 39–51)
HGB BLD-MCNC: 13.4 GM/DL (ref 13–17)
LYMPHOCYTES # BLD AUTO: 2.1 TH/MM3 (ref 1–4.8)
LYMPHOCYTES NFR BLD AUTO: 17 % (ref 9–44)
MCH RBC QN AUTO: 29.6 PG (ref 27–34)
MCHC RBC AUTO-ENTMCNC: 34.3 % (ref 32–36)
MCV RBC AUTO: 86.5 FL (ref 80–100)
MONOCYTE #: 1 TH/MM3 (ref 0–0.9)
MONOCYTES NFR BLD: 8.3 % (ref 0–8)
NEUTROPHILS # BLD AUTO: 9.1 TH/MM3 (ref 1.8–7.7)
NEUTROPHILS NFR BLD AUTO: 73.7 % (ref 16–70)
PLATELET # BLD: 281 TH/MM3 (ref 150–450)
PMV BLD AUTO: 8.1 FL (ref 7–11)
RBC # BLD AUTO: 4.54 MIL/MM3 (ref 4.5–5.9)
SODIUM SERPL-SCNC: 137 MEQ/L (ref 136–145)
WBC # BLD AUTO: 12.3 TH/MM3 (ref 4–11)

## 2018-02-25 RX ADMIN — HYDROCHLOROTHIAZIDE SCH MG: 25 TABLET ORAL at 08:22

## 2018-02-25 RX ADMIN — STANDARDIZED SENNA CONCENTRATE AND DOCUSATE SODIUM SCH TAB: 8.6; 5 TABLET, FILM COATED ORAL at 08:22

## 2018-02-25 RX ADMIN — VITAMIN D, TAB 1000IU (100/BT) SCH UNITS: 25 TAB at 08:22

## 2018-02-25 RX ADMIN — ENOXAPARIN SODIUM SCH MG: 40 INJECTION SUBCUTANEOUS at 15:45

## 2018-02-25 RX ADMIN — STANDARDIZED SENNA CONCENTRATE AND DOCUSATE SODIUM SCH TAB: 8.6; 5 TABLET, FILM COATED ORAL at 21:00

## 2018-02-25 RX ADMIN — VANCOMYCIN HYDROCHLORIDE SCH MLS/HR: 1 INJECTION, SOLUTION INTRAVENOUS at 12:44

## 2018-02-25 RX ADMIN — LOSARTAN POTASSIUM SCH MG: 50 TABLET, FILM COATED ORAL at 08:22

## 2018-02-25 RX ADMIN — PANTOPRAZOLE SODIUM SCH MG: 20 TABLET, DELAYED RELEASE ORAL at 08:22

## 2018-02-25 RX ADMIN — HYDROCODONE BITARTRATE AND ACETAMINOPHEN PRN TAB: 5; 325 TABLET ORAL at 21:44

## 2018-02-25 RX ADMIN — INSULIN ASPART SCH: 100 INJECTION, SOLUTION INTRAVENOUS; SUBCUTANEOUS at 07:59

## 2018-02-25 RX ADMIN — INSULIN ASPART SCH: 100 INJECTION, SOLUTION INTRAVENOUS; SUBCUTANEOUS at 21:00

## 2018-02-25 RX ADMIN — INSULIN ASPART SCH: 100 INJECTION, SOLUTION INTRAVENOUS; SUBCUTANEOUS at 12:24

## 2018-02-25 RX ADMIN — LEVOFLOXACIN SCH MLS/HR: 5 INJECTION, SOLUTION INTRAVENOUS at 09:55

## 2018-02-25 RX ADMIN — ASPIRIN SCH MG: 81 TABLET ORAL at 08:22

## 2018-02-25 RX ADMIN — CALCIUM SCH MG: 500 TABLET ORAL at 08:22

## 2018-02-25 RX ADMIN — PRAVASTATIN SODIUM SCH MG: 80 TABLET ORAL at 21:08

## 2018-02-25 RX ADMIN — INSULIN ASPART SCH: 100 INJECTION, SOLUTION INTRAVENOUS; SUBCUTANEOUS at 17:07

## 2018-02-25 NOTE — RADRPT
EXAM DATE/TIME:  02/25/2018 10:44 

 

[facility] COMPARISON:     

FOOT RIGHT COMPLETE ([id number]), February 24, 2018, 11:43.

       

 

 

INDICATIONS :     

Osteomyelitis. Pain and redness of 4th and 5th digits of right foot

                     

 

CONTRAST:     

14 cc Omniscan (gadodiamide) IV

                     

 

MEDICAL HISTORY :     

Hypertension.     

 

SURGICAL HISTORY :     

Inguinal hernia repair.     Orthopedic to left ankle.

 

ENCOUNTER:     

Initial

 

ACUITY:     

2 day

 

PAIN SCORE:     

3/10

 

LOCATION:     

Right   foot

 

TECHNIQUE:     

Multiplanar, multisequence MRI examination was performed without contrast and after the intravenous a
dministration of gadolinium.

 

FINDINGS:     

 

BONE/CARTILAGE:     

Bone marrow signal is homogeneous. Articular cartilage signal is within normal limits.

 

TENDONS:     

All of the visualized tendons are intact.

 

MISCELLANEOUS:     

There is edema and enhancement seen at the fourth and fifth digits being most prominent at the distal
 aspect of the fifth digit surrounding the distal phalanx but not involving the bony structures. 

 

POST-CONTRAST:     

There are no abnormal areas of enhancement on the post-contrast images are seen within the bony struc
tures. There is some enhancement within the soft tissue at the fourth and fifth digits. 

 

CONCLUSION:     

Soft tissue swelling and laboratory change/enhancement at the fourth and fifth digits without osteomy
elitis seen.

 

 

 

 Daniel Guaman MD on February 25, 2018 at 11:46           

Board Certified Radiologist.

 This report was verified electronically.

## 2018-02-25 NOTE — MB
cc:

AGNES BELL DPM

****

 

 

DATE OF CONSULTATION:  02/25/2018

 

REASON FOR CONSULTATION:

Worsening right foot infection.

 

HISTORY OF PRESENT ILLNESS

This is a 86-year-old male who is known to my partner Dr. Almeida. He was

having worsening pain and drainage after receiving oral antibiotics. His wife

actually called our office yesterday. I reviewed the patient's clinical

findings by phone, it sounded bad, so the patient should go to the hospital.

Currently I am seeing the patient bedside.  He is having mild right foot pain.

He is resting comfortably.

 

PAST MEDICAL HISTORY

Positive for:

1.  Hypertension.

2.  Hyperlipidemia.

3.  Polio.

4.  GERD.

 

PAST SURGICAL HISTORY

1.  Hernia repair.

2.  Liver biopsy with hepatitis B.

3.  Ankle fusion of the left.

4.  He has a current orthopedic condition, right acromion process  

that did not require surgery.

 

MEDICATION

Reported outpatient medications:

1.  Doxycycline.

2.  Calcium.

3.  Vitamin D.

4.  Aspirin.

5.  Tribenzor.

6.  Simvastatin.

7.  Omeprazole.

8.  Currently inpatient antibiotic of vancomycin.

 

Please see complete med list in chart.

 

 

PHYSICAL EXAMINATION

VITAL SIGNS:  Temperature 97.9, pulse rate 66, respiratory rate 16, blood

pressure 99/62. He is sating 95% on room air.

 

This is an alert and oriented gentleman seen bedside exhibiting nonlabored

respirations.  The right lower extremity is examined. There is no  

serous purulent third and fourth digit interspace but there is significant

pain, there is moderate edema, redness does not really extend from the

interspace. The patient is capable of moving the digits. Pulses are palpable.

Sensation is intact.  The left lower extremity, there limited range of motion

of the ankle and hind-foot. There is a shortened foot stature with obvious

signs of muscle wasting of the distal leg, however, no compromise of the

softened tissue envelope.

 

LABORATORY FINDINGS

White blood cells 16, hemoglobin and hematocrit 13 and 40.  Chem-7 sodium 136,

potassium 3.7, chloride 102, C02 23.2, BUN 20 and glucose 119.

 

IMAGING FINDINGS

Foot x-ray unremarkable, no signs of gas within the tissue, foreign body. MRI

ordered. Microbial findings ordered and pending.

 

ASSESSMENT AND PLAN

Right foot cellulitis with possible interspace abscess. Dilute Betadine

wet-to-dry will be performed at the interspace. Will continue to follow the

culture and the patient's clinical progress.  I recommend adding Levaquin to

cover pseudomonas. This appears to be interspace infection which may correlate

with pseudomonas.  The patient may need stronger IV pending on the culture.

Will continue to follow along with this patient. No surgery planned at this

point, however, if the MRI shows deep abscess, we may need to intervene.  The

patient likely will need minimum of 1-2 days of IV antibiotics until we get

this under control.

 

 

                              _________________________________

                              KEVIN Turcios/RYNE

D:  2/25/2018/7:10 AM

T:  2/26/2018/8:41 AM

Visit #:  [id number]

Job #:  [id number]

[id number]

## 2018-02-25 NOTE — HHI.PR
Subjective


Remarks


"I am good "patient is very pleasant, denied fever or chills, his right foot in 

gauze seen by podiatry and to continue iv antibiotic and follow closely





Objective


Vitals





Vital Signs








  Date Time  Temp Pulse Resp B/P (MAP) Pulse Ox O2 Delivery O2 Flow Rate FiO2


 


2/25/18 12:00 98.2 77 20 117/56 (76) 94   


 


2/25/18 11:36   17     


 


2/25/18 08:27     94   


 


2/25/18 07:59 96.3 84 20 152/70 (97) 94   


 


2/25/18 04:22 97.9 66 16 99/62 (74) 95   


 


2/25/18 00:43 98.2 69 18 105/63 (77) 94   


 


2/24/18 22:00     93   21


 


2/24/18 21:00  80      


 


2/24/18 19:55 98.1 75 16 112/73 (86) 95   


 


2/24/18 18:08   18     


 


2/24/18 15:50 98.4 79 20 130/62 (84) 93   


 


2/24/18 14:38     96   21


 


2/24/18 14:25 97.0 80 18 137/66 (89) 96   


 


2/24/18 14:06        














I/O      


 


 2/24/18 2/24/18 2/24/18 2/25/18 2/25/18 2/25/18





 07:00 15:00 23:00 07:00 15:00 23:00


 


Intake Total  250 ml 240 ml   


 


Output Total    200 ml  


 


Balance  250 ml 240 ml -200 ml  


 


      


 


Intake Oral   240 ml   


 


IV Total  250 ml    


 


Output Urine Total    200 ml  


 


# Voids    2  


 


# Bowel Movements    0  








Result Diagram:  


2/25/18 0835                                                                   

             2/25/18 0835





Objective Remarks


GENERAL: Well-developed, well-nourished patient in [facility].


SKIN: Warm and dry. No rash.  Right third, fourth and fifth digit and 

maceration and webspace, erythema.


HEAD:  Normocephalic. Atraumatic.


EYES: Pupils equal and round. No scleral icterus. No injection or drainage. 


ENT: No nasal bleeding or discharge.  Mucous membranes pink and moist.


NECK: Supple. Trachea midline.  


CARDIOVASCULAR: Regular rate and rhythm.  S1, S2 noted. No murmur appreciated. 


RESPIRATORY: No accessory muscle use. Clear to auscultation. Breath sounds 

equal bilaterally.  


GASTROINTESTINAL: Abdomen soft, non-tender, nondistended. Normoactive bowel 

sounds x4.


NEUROLOGICAL: Awake and alert. No obvious cranial nerve deficits.  Motor 

grossly within normal limits. 5/5 muscle strength in bilateral upper and lower 

extremities.  Normal speech.


PSYCHIATRIC: Appropriate mood and affect; insight and judgment normal.





A/P


Problem List:  


(1) Cellulitis


ICD Code:  L03.90 - Cellulitis, unspecified


Status:  Acute


Assessment and Plan





This is a 86-year-old male patient with a known medical history of 

hyperlipidemia, hypertension, GERD and polio who presented to the ED with right 

third, fourth and fifth digit cellulitis failed outpatient antibiotic treatment.





Cellulitis of the right third, fourth and fifth digit toes


Failed outpatient antibiotic therapy


   With leukocytosis, white blood cell 16.  Lactic acid 2.0.  Wound culture 

ordered and pending.  Monitor for infection.  Afebrile.  Vital signs are stable.


   Right foot x-ray reviewed showing unremarkable exam.


   Podiatry consulted, appreciate further input and recommendations.  Would 

continue on vancomycin and Levaquin to cover Pseudomonas, further workup/

debridement per podiatry


   Started on vancomycin IV.  Pharmacy to dose.


   Control pain, Norco available p.o. for pain scale.





Hypertension, chronic: Monitor BP trends.  Continue p.o. home medications.





Hyperlipidemia, chronic: Continue home statin.





DVT prophylaxis: SCDs.  Lovenox.





Problem Qualifiers





(1) Cellulitis:  


Qualified Codes:  L03.031 - Cellulitis of right toe








Dmitriy Castillo MD Feb 25, 2018 13:24

## 2018-02-26 VITALS
SYSTOLIC BLOOD PRESSURE: 131 MMHG | DIASTOLIC BLOOD PRESSURE: 76 MMHG | RESPIRATION RATE: 18 BRPM | HEART RATE: 79 BPM | TEMPERATURE: 97.7 F | OXYGEN SATURATION: 95 %

## 2018-02-26 VITALS
SYSTOLIC BLOOD PRESSURE: 147 MMHG | OXYGEN SATURATION: 95 % | DIASTOLIC BLOOD PRESSURE: 64 MMHG | RESPIRATION RATE: 20 BRPM | HEART RATE: 71 BPM | TEMPERATURE: 97.1 F

## 2018-02-26 VITALS
RESPIRATION RATE: 20 BRPM | HEART RATE: 86 BPM | SYSTOLIC BLOOD PRESSURE: 126 MMHG | OXYGEN SATURATION: 96 % | DIASTOLIC BLOOD PRESSURE: 65 MMHG | TEMPERATURE: 97.8 F

## 2018-02-26 VITALS — OXYGEN SATURATION: 95 %

## 2018-02-26 VITALS
TEMPERATURE: 96.3 F | HEART RATE: 74 BPM | RESPIRATION RATE: 20 BRPM | SYSTOLIC BLOOD PRESSURE: 135 MMHG | DIASTOLIC BLOOD PRESSURE: 61 MMHG | OXYGEN SATURATION: 95 %

## 2018-02-26 VITALS
HEART RATE: 80 BPM | DIASTOLIC BLOOD PRESSURE: 78 MMHG | RESPIRATION RATE: 17 BRPM | TEMPERATURE: 97.5 F | OXYGEN SATURATION: 96 % | SYSTOLIC BLOOD PRESSURE: 128 MMHG

## 2018-02-26 VITALS
HEART RATE: 89 BPM | SYSTOLIC BLOOD PRESSURE: 123 MMHG | TEMPERATURE: 96.9 F | DIASTOLIC BLOOD PRESSURE: 68 MMHG | RESPIRATION RATE: 20 BRPM | OXYGEN SATURATION: 95 %

## 2018-02-26 RX ADMIN — VITAMIN D, TAB 1000IU (100/BT) SCH UNITS: 25 TAB at 10:37

## 2018-02-26 RX ADMIN — PRAVASTATIN SODIUM SCH MG: 80 TABLET ORAL at 21:58

## 2018-02-26 RX ADMIN — HYDROCODONE BITARTRATE AND ACETAMINOPHEN PRN TAB: 7.5; 325 TABLET ORAL at 12:44

## 2018-02-26 RX ADMIN — LEVOFLOXACIN SCH MLS/HR: 5 INJECTION, SOLUTION INTRAVENOUS at 10:35

## 2018-02-26 RX ADMIN — ASPIRIN SCH MG: 81 TABLET ORAL at 10:37

## 2018-02-26 RX ADMIN — VANCOMYCIN HYDROCHLORIDE SCH MLS/HR: 1 INJECTION, SOLUTION INTRAVENOUS at 12:29

## 2018-02-26 RX ADMIN — PANTOPRAZOLE SODIUM SCH MG: 20 TABLET, DELAYED RELEASE ORAL at 10:37

## 2018-02-26 RX ADMIN — INSULIN ASPART SCH: 100 INJECTION, SOLUTION INTRAVENOUS; SUBCUTANEOUS at 08:00

## 2018-02-26 RX ADMIN — CALCIUM SCH MG: 500 TABLET ORAL at 10:37

## 2018-02-26 RX ADMIN — HYDROCODONE BITARTRATE AND ACETAMINOPHEN PRN TAB: 7.5; 325 TABLET ORAL at 08:03

## 2018-02-26 RX ADMIN — STANDARDIZED SENNA CONCENTRATE AND DOCUSATE SODIUM SCH TAB: 8.6; 5 TABLET, FILM COATED ORAL at 21:58

## 2018-02-26 RX ADMIN — HYDROCHLOROTHIAZIDE SCH MG: 25 TABLET ORAL at 10:37

## 2018-02-26 RX ADMIN — STANDARDIZED SENNA CONCENTRATE AND DOCUSATE SODIUM SCH TAB: 8.6; 5 TABLET, FILM COATED ORAL at 10:38

## 2018-02-26 RX ADMIN — ENOXAPARIN SODIUM SCH MG: 40 INJECTION SUBCUTANEOUS at 14:47

## 2018-02-26 RX ADMIN — LOSARTAN POTASSIUM SCH MG: 50 TABLET, FILM COATED ORAL at 10:36

## 2018-02-26 NOTE — HHI.PR
Subjective


Remarks


Patient seen bedside this a.m. with wife present.  Patient denies any nausea 

vomiting fevers or chills.  Would like to go home today.





Objective





Vital Signs








  Date Time  Temp Pulse Resp B/P (MAP) Pulse Ox O2 Delivery O2 Flow Rate FiO2


 


2/26/18 04:00 97.5 80 17 128/78 (95) 96   


 


2/26/18 00:00 97.7 79 18 131/76 (94) 95   


 


2/25/18 20:00     93   21


 


2/25/18 20:00 97.5 84 16 137/83 (101) 94   


 


2/25/18 15:55 98.1 82 20 142/62 (88) 95   


 


2/25/18 12:00 98.2 77 20 117/56 (76) 94   


 


2/25/18 11:36   17     


 


2/25/18 08:27     94   


 


2/25/18 07:59 96.3 84 20 152/70 (97) 94   














I/O      


 


 2/25/18 2/25/18 2/25/18 2/26/18 2/26/18 2/26/18





 07:00 15:00 23:00 07:00 15:00 23:00


 


Intake Total  580 ml 360 ml   


 


Output Total 200 ml   600 ml  


 


Balance -200 ml 580 ml 360 ml -600 ml  


 


      


 


Intake Oral  580 ml 360 ml   


 


Output Urine Total 200 ml   600 ml  


 


# Voids 2 2 2   


 


# Bowel Movements 0 1 1   








Result Diagram:  


2/25/18 0835                                                                   

             2/25/18 0835





Imaging





Last Impressions








Foot MRI 2/25/18 0000 Signed





Impressions: 





 Service Date/Time:  Sunday, February 25, 2018 10:44 - CONCLUSION:  Soft tissue 





 swelling and laboratory change/enhancement at the fourth and fifth digits 





 without osteomyelitis seen.     Daniel Guaman MD 


 


Foot X-Ray 2/24/18 0000 Signed





Impressions: 





 Service Date/Time:  Saturday, February 24, 2018 11:43 - CONCLUSION:  





 Unremarkable exam for patient's age.     Sylvester Mares MD 








Other Results





Microbiology








 Date/Time


Source Procedure


Growth Status


 


 


 2/24/18 11:23


Wound Foot Gram Stain - Final Resulted


 


 2/24/18 11:23 Wound Culture - Preliminary


Pseudomonas Species Resulted








Objective Remarks


Lower extremity physical exam:


Vascular: Dorsalis pedis 1/4 , posterior tibial 1/4.  Capillary refill time 

within normal limits to digits 5 bilateral foot.  Edema present in resolving 

right foot


Neuro: Gross sensation intact to bilateral lower extremity.  Pinpoint sensation 

within normal limits. No hyperalgesia noted to bilateral lower extremity


Dermatology: Normal temperature and turgor to bilateral lower extremity.  

Interdigital maceration with ulceration noted to second, third, fourth 

interspace; fourth interspace most severely affected.  No open sinus or portal 

noted, no probe to bone noted.  


Musculoskeletal: Tender to palpation to right interdigital spaces to 3 and 4.  

Hammertoes noted 2 through 5 right foot.


Medications and IVs





Current Medications








 Medications


  (Trade)  Dose


 Ordered  Sig/Dara


 Route  Start Time


 Stop Time Status Last Admin


 


 Pharmacy Profile


 Note  0 ml @ 0


 mls/hr  UNSCH


 OTHER  2/24/18 13:30


     


 


 


  (Tylenol)  650 mg  Q4H  PRN


 PO  2/24/18 13:30


     


 


 


  (Zofran Inj)  4 mg  Q6H  PRN


 IVP  2/24/18 13:30


     


 


 


  (Lovenox Inj)  40 mg  Q24H


 SQ  2/24/18 14:00


    2/25/18 15:45


 


 


  (Norco  5-325 Mg)  1 tab  Q4H  PRN


 PO  2/24/18 13:30


    2/25/18 21:44


 


 


  (Norco  7.5-325


 Mg)  1 tab  Q4H  PRN


 PO  2/24/18 13:30


     


 


 


  (Mary Kay-Colace)  1 tab  BID


 PO  2/24/18 21:00


     


 


 


  (Milk Of


 Magnesia Liq)  30 ml  Q12H  PRN


 PO  2/24/18 13:30


     


 


 


 Vancomycin HCl


 1000 mg/Sodium


 Chloride  250 ml @ 


 250 mls/hr  Q24H


 IV  2/25/18 12:00


    2/25/18 12:44


 


 


 Miscellaneous


 Information  SPECIFIC


 LAB TO BE


 [facility]...  ONCE  ONCE


 .XX  2/27/18 11:45


 2/27/18 11:46   


 


 


  (NovoLOG


 SUPPLEMENTAL


 SCALE)  1  ACHS SLIDING  SCALE


 SQ  2/24/18 21:00


     


 


 


  (Glucagon Inj)  1 mg  UNSCH  PRN


 OTHER  2/24/18 18:15


     


 


 


  (D50w (Vial) Inj)  50 ml  UNSCH  PRN


 IV PUSH  2/24/18 18:15


     


 


 


  (Ecotrin Ec)  81 mg  DAILY


 PO  2/25/18 09:00


    2/25/18 08:22


 


 


  (Vitamin D3)  1,000 units  DAILY


 PO  2/25/18 09:00


    2/25/18 08:22


 


 


  (Oscal)  500 mg  DAILY


 PO  2/25/18 09:00


    2/25/18 08:22


 


 


  (Protonix)  20 mg  DAILY


 PO  2/25/18 09:00


    2/25/18 08:22


 


 


  (Pravachol)  80 mg  HS


 PO  2/24/18 21:00


    2/25/18 21:08


 


 


  (Cozaar)  100 mg  DAILY


 PO  2/25/18 09:00


    2/25/18 08:22


 


 


  (Hydrodiuril)  25 mg  DAILY


 PO  2/25/18 09:00


    2/25/18 08:22


 


 


  (Norvasc)  10 mg  DAILY


 PO  2/25/18 09:00


    2/25/18 08:22


 


 


 Levofloxacin/


 Dextrose  150 ml @ 


 100 mls/hr  Q24H


 IV  2/25/18 09:00


    2/25/18 09:55


 











Assessment and Plan


Assessment and Plan


86-year-old male with right foot infection


Patient examined and evaluated


Culture growing Pseudomonas


Okay to DC by podiatry tomorrow, patient would benefit from 1 more day of IV 

antibiotics


Recommend oral antibiotic transition possible Levaquin; Discussed with Marilyn ASHBY


Recommend home health care with [facility] -dressings to consist of Silvadene as 

it does have pseudomonal coverage and dry sterile dressing


MRI negative for abscess, clinical improvement noted and no suspicion for 

abscess.  MRI also negative for any osteomyelitis.


Patient is to follow-up with Dr. Almeida in [facility], as he states that who he 

usually sees and who he would like to follow-up with


Discussed Condition With


Marilyn Gonsalves DPM Feb 26, 2018 08:02

## 2018-02-26 NOTE — HHI.FF
Face to Face Verification


Diagnosis:  


(1) Cellulitis


Physical Therapy


Order:  Evaluate and Treat, Improve ambulation, Strength and gait training





Home Health Nursing








Order: Medical education





 Signs/symptoms of disease process





 Medication education-adverse effect





 Wound care and dressing changes





 Nursing assessment with vital signs

















I have seen patient Mirza Day on 2/26/18. My clinical findings support 

the need for the requested home health care services because:








 Deconditioned w/ increased weakness





 Limited ability to care for self














I certify that my clinical findings support that this patient is homebound 

because:








 Unsteady gait/balance

















Marilyn Canela Feb 26, 2018 08:42

## 2018-02-26 NOTE — HHI.PR
Subjective


Remarks


Patient seen bedside this a.m. with wife present.  Patient denies any nausea 

vomiting fevers or chills.  Would like to go home today.





Objective





Vital Signs








  Date Time  Temp Pulse Resp B/P (MAP) Pulse Ox O2 Delivery O2 Flow Rate FiO2


 


2/26/18 15:37 96.3 74 20 135/61 (85) 95   


 


2/26/18 11:26 97.8 86 20 126/65 (85) 96   


 


2/26/18 09:00     95   21


 


2/26/18 07:50 97.1 71 20 147/64 (91) 95   


 


2/26/18 04:00 97.5 80 17 128/78 (95) 96   


 


2/26/18 00:00 97.7 79 18 131/76 (94) 95   


 


2/25/18 20:00     93   21


 


2/25/18 20:00 97.5 84 16 137/83 (101) 94   














I/O      


 


 2/25/18 2/25/18 2/25/18 2/26/18 2/26/18 2/26/18





 07:00 15:00 23:00 07:00 15:00 23:00


 


Intake Total  580 ml 360 ml   400 ml


 


Output Total 200 ml   600 ml  


 


Balance -200 ml 580 ml 360 ml -600 ml  400 ml


 


      


 


Intake Oral  580 ml 360 ml   


 


IV Total      400 ml


 


Output Urine Total 200 ml   600 ml  


 


# Voids 2 2 2   


 


# Bowel Movements 0 1 1   








Result Diagram:  


2/25/18 0835                                                                   

             2/25/18 0835





Objective Remarks


Lower extremity physical exam:


Vascular: Dorsalis pedis 1/4 , posterior tibial 1/4.  Capillary refill time 

within normal limits to digits 5 bilateral foot.  Edema present in resolving 

right foot


Neuro: Gross sensation intact to bilateral lower extremity.  Pinpoint sensation 

within normal limits. No hyperalgesia noted to bilateral lower extremity


Dermatology: Normal temperature and turgor to bilateral lower extremity.  

Interdigital maceration with ulceration noted to second, third, fourth 

interspace; fourth interspace most severely affected.  No open sinus or portal 

noted, no probe to bone noted.  


Musculoskeletal: Tender to palpation to right interdigital spaces to 3 and 4.  

Hammertoes noted 2 through 5 right foot.





Assessment and Plan


Assessment and Plan


86-year-old male with right foot infection


Patient examined and evaluated


Culture growing Pseudomonas


Okay to DC by podiatry tomorrow, patient would benefit from 1 more day of IV 

antibiotics


Recommend oral antibiotic transition possible Levaquin; Discussed with Marilyn ASHBY


Recommend home health care with [facility] -dressings to consist of Silvadene as 

it does have pseudomonal coverage and dry sterile dressing


MRI negative for abscess, clinical improvement noted and no suspicion for 

abscess.  MRI also negative for any osteomyelitis.


Patient is to follow-up with Dr. Almeida in [facility], as he states that who he 

usually sees and who he would like to follow-up with











Marilyn Vargas DPM Feb 26, 2018 16:05

## 2018-02-26 NOTE — HHI.FF
Face to Face Verification


Diagnosis:  


(1) Cellulitis


Home Health Nursing








Order: Wound care and dressing changes








Instructions:


please perform dressing changes to right foot consisting of Silvadene to second

, third, and fourth interspaces.  Cleansed with normal saline.  Dressing 

changes to be performed daily.  Okay for home health care to perform dressing 

changes 3 times a week.











I have seen patient Mirza Day on 2/26/18. My clinical findings support 

the need for the requested home health care services because:








 High risk of falls





 Infection w/ risk of complications














I certify that my clinical findings support that this patient is homebound 

because:








 Unsteady gait/balance





 Unsafe to leave home unassisted

















Marilyn Vargas DPM Feb 26, 2018 08:09

## 2018-02-26 NOTE — HHI.PR
Subjective


Remarks


Follow-up cellulitis.  Patient seen and examined, sitting comfortably in chair 

in no apparent distress.  Patient denies any change in clinical condition.  

Does state that pain is controlled with medication regimen.  Podiatry in today 

to see patient, dressing changed and patient complained of increasing pain.  

Vital signs are stable.  Afebrile.





Objective


Vitals





Vital Signs








  Date Time  Temp Pulse Resp B/P (MAP) Pulse Ox O2 Delivery O2 Flow Rate FiO2


 


2/26/18 07:50 97.1 71 20 147/64 (91) 95   


 


2/26/18 04:00 97.5 80 17 128/78 (95) 96   


 


2/26/18 00:00 97.7 79 18 131/76 (94) 95   


 


2/25/18 20:00     93   21


 


2/25/18 20:00 97.5 84 16 137/83 (101) 94   


 


2/25/18 15:55 98.1 82 20 142/62 (88) 95   


 


2/25/18 12:00 98.2 77 20 117/56 (76) 94   


 


2/25/18 11:36   17     














I/O      


 


 2/25/18 2/25/18 2/25/18 2/26/18 2/26/18 2/26/18





 06:59 14:59 22:59 06:59 14:59 22:59


 


Intake Total  580 ml 360 ml   


 


Output Total 200 ml   600 ml  


 


Balance -200 ml 580 ml 360 ml -600 ml  


 


      


 


Intake Oral  580 ml 360 ml   


 


Output Urine Total 200 ml   600 ml  


 


# Voids 1 3 2   


 


# Bowel Movements 0 1 1   








Result Diagram:  


2/25/18 0835                                                                   

             2/25/18 0835





Imaging





Last Impressions








Foot MRI 2/25/18 0000 Signed





Impressions: 





 Service Date/Time:  Sunday, February 25, 2018 10:44 - CONCLUSION:  Soft tissue 





 swelling and laboratory change/enhancement at the fourth and fifth digits 





 without osteomyelitis seen.     Daniel Guaman MD 


 


Foot X-Ray 2/24/18 0000 Signed





Impressions: 





 Service Date/Time:  Saturday, February 24, 2018 11:43 - CONCLUSION:  





 Unremarkable exam for patient's age.     Sylvester Mares MD 








Objective Remarks


GENERAL: Well-developed, well-nourished patient in NAD.


SKIN: Warm and dry. No rash.  Right third, fourth and fifth digit and 

maceration and webspace, erythema.


HEAD:  Normocephalic. Atraumatic.


EYES: Pupils equal and round. No scleral icterus. No injection or drainage. 


ENT: No nasal bleeding or discharge.  Mucous membranes pink and moist.


NECK: Supple. Trachea midline.  


CARDIOVASCULAR: Regular rate and rhythm.  S1, S2 noted. No murmur appreciated. 


RESPIRATORY: No accessory muscle use. Clear to auscultation. Breath sounds 

equal bilaterally.  


GASTROINTESTINAL: Abdomen soft, non-tender, nondistended. Normoactive bowel 

sounds x4.


NEUROLOGICAL: Awake and alert. No obvious cranial nerve deficits.  Motor 

grossly within normal limits. 5/5 muscle strength in bilateral upper and lower 

extremities.  Normal speech.


PSYCHIATRIC: Appropriate mood and affect; insight and judgment normal.





A/P


Problem List:  


(1) Cellulitis


ICD Code:  L03.90 - Cellulitis, unspecified


Status:  Acute


Assessment and Plan


This is a 86-year-old male patient with a known medical history of 

hyperlipidemia, hypertension, GERD and polio who presented to the ED with right 

third, fourth and fifth digit cellulitis failed outpatient antibiotic treatment.





Cellulitis of the right third, fourth and fifth digit toes


Failed outpatient antibiotic therapy


   With leukocytosis on presentation, improving. Lactic acid 2.0.  Wound 

culture is growing Pseudomonas sensitive to Levaquin, Afebrile.  Vital signs 

are stable.


   Right foot x-ray reviewed showing unremarkable exam.


   Podiatry consulted, appreciate further input and recommendations.  Saw 

patient today, wound change today.  Recommendations for discharge tomorrow on 

PO antibiotics.


   Continue IV antibiotics for 1 more day today.  Anticipate discharge tomorrow 

on Levaquin.


   Control pain, Norco available p.o. for pain scale.





Hypertension, chronic: Monitor BP trends.  Continue p.o. home medications.





Hyperlipidemia, chronic: Continue home statin.





DVT prophylaxis: SCDs.  Lovenox.


Discharge Planning


DC Tomorrow with [facility] PT, dressing changes.





Problem Qualifiers





(1) Cellulitis:  


Qualified Codes:  L03.031 - Cellulitis of right toe








Marilyn Canela Feb 26, 2018 08:44

## 2018-02-27 VITALS
HEART RATE: 86 BPM | OXYGEN SATURATION: 97 % | RESPIRATION RATE: 18 BRPM | TEMPERATURE: 98.3 F | SYSTOLIC BLOOD PRESSURE: 129 MMHG | DIASTOLIC BLOOD PRESSURE: 60 MMHG

## 2018-02-27 VITALS
TEMPERATURE: 98.3 F | SYSTOLIC BLOOD PRESSURE: 138 MMHG | OXYGEN SATURATION: 97 % | RESPIRATION RATE: 18 BRPM | HEART RATE: 84 BPM | DIASTOLIC BLOOD PRESSURE: 66 MMHG

## 2018-02-27 VITALS
RESPIRATION RATE: 20 BRPM | OXYGEN SATURATION: 96 % | DIASTOLIC BLOOD PRESSURE: 59 MMHG | SYSTOLIC BLOOD PRESSURE: 117 MMHG | HEART RATE: 74 BPM | TEMPERATURE: 97.6 F

## 2018-02-27 VITALS — OXYGEN SATURATION: 95 %

## 2018-02-27 LAB
CREAT SERPL-MCNC: 1.4 MG/DL (ref 0.6–1.3)
GFR SERPLBLD BASED ON 1.73 SQ M-ARVRAT: 48 ML/MIN (ref 89–?)

## 2018-02-27 RX ADMIN — STANDARDIZED SENNA CONCENTRATE AND DOCUSATE SODIUM SCH TAB: 8.6; 5 TABLET, FILM COATED ORAL at 09:00

## 2018-02-27 RX ADMIN — VANCOMYCIN HYDROCHLORIDE SCH MLS/HR: 1 INJECTION, SOLUTION INTRAVENOUS at 12:34

## 2018-02-27 RX ADMIN — HYDROCHLOROTHIAZIDE SCH MG: 25 TABLET ORAL at 08:59

## 2018-02-27 RX ADMIN — CALCIUM SCH MG: 500 TABLET ORAL at 08:59

## 2018-02-27 RX ADMIN — ASPIRIN SCH MG: 81 TABLET ORAL at 08:59

## 2018-02-27 RX ADMIN — PANTOPRAZOLE SODIUM SCH MG: 20 TABLET, DELAYED RELEASE ORAL at 08:59

## 2018-02-27 RX ADMIN — LEVOFLOXACIN SCH MLS/HR: 5 INJECTION, SOLUTION INTRAVENOUS at 09:02

## 2018-02-27 RX ADMIN — LOSARTAN POTASSIUM SCH MG: 50 TABLET, FILM COATED ORAL at 09:01

## 2018-02-27 RX ADMIN — VITAMIN D, TAB 1000IU (100/BT) SCH UNITS: 25 TAB at 09:00

## 2018-02-27 NOTE — HHI.DS
__________________________________________________





Discharge Summary


Admission Date


Feb 24, 2018 at 13:27


Discharge Date:  Feb 27, 2018


Admitting Diagnosis


Cellulitis, right 4th and 5th toes





(1) Cellulitis


ICD Code:  L03.90 - Cellulitis, unspecified


Status:  Acute


Procedures


None


Brief History - From Admission


Written by Marilyn Canela, acting as scribe for Dr. Castillo on 2/24/18 at 18:18. 


This is a 86-year-old male patient with a known medical history of 

hyperlipidemia, hypertension, GERD and polio who presented to the ED with right 

third, fourth and fifth digit cellulitis failed outpatient antibiotic 

treatment.  Patient states that his symptoms started roughly 3 weeks ago with 

some mild discomfort between his toes, called his primary care doctor who 

started him on an antifungal cream.  For a week or so the symptoms continued to 

persist and even worsen which brought him to the podiatrist who started him on 

doxycycline 3 days ago for cellulitis.  Patient states his toes have worsened 

which led to his presentation.  Patient denies any recent illness including 

fever, chills, cough, shortness of breath, abdominal pain, nausea, vomiting, 

diarrhea or dysuria.  Patient's denies any pain.  Does have a history of polio 

and left leg with history of surgery, recent fall breaking his right acromion 

process which did not require surgery.


CBC/BMP:  


2/25/18 0835                                                                   

             2/27/18 0620





Significant Findings





Laboratory Tests








Test


  2/25/18


08:35 2/27/18


06:20


 


White Blood Count


  12.3 TH/MM3


(4.0-11.0) 


 


 


Neutrophils (%) (Auto)


  73.7 %


(16.0-70.0) 


 


 


Monocytes (%) (Auto)


  8.3 %


(0.0-8.0) 


 


 


Neutrophils # (Auto)


  9.1 TH/MM3


(1.8-7.7) 


 


 


Monocytes # (Auto)


  1.0 TH/MM3


(0-0.9) 


 


 


Blood Urea Nitrogen


  22 MG/DL


(7-18) 


 


 


Estimat Glomerular Filtration


Rate 63 ML/MIN


(>89) 48 ML/MIN


(>89)


 


Creatinine


  


  1.40 MG/DL


(0.60-1.30)








Imaging





Last Impressions








Foot MRI 2/25/18 0000 Signed





Impressions: 





 Service Date/Time:  Sunday, February 25, 2018 10:44 - CONCLUSION:  Soft tissue 





 swelling and laboratory change/enhancement at the fourth and fifth digits 





 without osteomyelitis seen.     Daniel Guaman MD 


 


Foot X-Ray 2/24/18 0000 Signed





Impressions: 





 Service Date/Time:  Saturday, February 24, 2018 11:43 - CONCLUSION:  





 Unremarkable exam for patient's age.     Sylvester Mares MD 








PE at Discharge


GENERAL: Well-developed, well-nourished patient in NAD.


SKIN: Warm and dry. No rash.  Right third, fourth and fifth digit and 

maceration and webspace, erythema.


HEAD:  Normocephalic. Atraumatic.


EYES: Pupils equal and round. No scleral icterus. No injection or drainage. 


ENT: No nasal bleeding or discharge.  Mucous membranes pink and moist.


NECK: Supple. Trachea midline.  


CARDIOVASCULAR: Regular rate and rhythm.  S1, S2 noted. No murmur appreciated. 


RESPIRATORY: No accessory muscle use. Clear to auscultation. Breath sounds 

equal bilaterally.  


GASTROINTESTINAL: Abdomen soft, non-tender, nondistended. Normoactive bowel 

sounds x4.


NEUROLOGICAL: Awake and alert. No obvious cranial nerve deficits.  Motor 

grossly within normal limits. 5/5 muscle strength in bilateral upper and lower 

extremities.  Normal speech.


PSYCHIATRIC: Appropriate mood and affect; insight and judgment normal.


Hospital Course


Estimated 6-year-old male who originally presented to the hospital on 2/24/18 

because of 3 week history of discomfort between his toes of his right lower 

extremity.  Primary doctor started him on some antifungal cream in which his 

symptoms continued.  Patient was started on doxycycline 3 days prior to 

admission because of cellulitis.  His toes did not get any better so he came to 

emergency department for evaluation.  Patient was evaluated in emergency 

department recommended admission the hospital.  He did undergo radiological 

studies which did not indicate any abscess that required surgical intervention.

  Patient failed outpatient management with doxycycline and was admitted the 

hospital with vancomycin, Levaquin.  Cultures were taken which did indicate 

multiple organisms which were sensitive to Levaquin.  Podiatrist was consulted 

and followed the patient during his stay in the hospital.  No surgical 

intervention was needed.  Recommending home health care with outpatient wound 

management.  Outpatient follow-up.  Continuation of Levaquin upon discharge.  

Creatinine clearance did mildly worsen while his stay in the hospital.  

Levaquin will need to be dosed for renal.  Patient clinically stable this time.

  Home health care has been arranged.  Will discharge home after his dose of 

vancomycin today.  Patient will follow-up with primary medical doctor, 

podiatrist.


Pt Condition on Discharge:  Stable


Discharge Disposition:  Disch w/ Home Health Serv


Discharge Time:  > 30 minutes


Discharge Instructions


DIET: Follow Instructions for:  As Tolerated, No Restrictions


Activities you can perform:  Regular-No Restrictions


Activities to Avoid:  Driving for 24 hrs


Follow up Referrals:  


Home Health with [facility]


PCP Follow-up - 1 Week


Podiatry - 1 Week with Ji Almeida III, DPM





New Medications:  


Levofloxacin (Levofloxacin) 750 Mg Tablet


250 MG PO DAILY for Infection for 12 Days, #4 TAB 0 Refills





Walker Rolling/GetGo (Walker Rolling/GetGo) 1 Mis Mis


EA .XX AS DIRECTED, #1





Hydrocodone/Acetaminophen (Hydrocodone-Acetamin 5-325 mg) 5 Mg-325 Mg Tablet


1 TAB PO Q4H PRN for pain management for 5 Days, #30 TAB





 


Continued Medications:  


Aspirin DR (Aspirin EC) 81 Mg Tabdr


81 MG PO DAILY for Blood Clot Prevention, TAB 0 Refills





Calcium Carbonate (Calcium 600) 600 Mg Calcium (1500 Mg) Tab


600 MG PO DAILY for Nutritional Supplement, TAB





Cholecalciferol (Vitamin D-1000) 1,000 Unit Tab


1000 UNITS PO DAILY for Nutritional Supplement, #1 BOTTLE 0 Refills





Olmesartan-Amlodipine-Hydrochlorothiazide (Tribenzor) 40-10-25 mg Tab


1 TAB PO DAILY for Blood Pressure Management, #30 TAB 0 Refills





Omeprazole (Omeprazole) 20 Mg Tab


20 MG PO DAILY for Reflux, #30 TAB 0 Refills





Simvastatin (Simvastatin) 40 Mg Tab


40 MG PO HS for Cholesterol Management, #30 TAB 0 Refills

















Geovanni Vazquez Feb 27, 2018 14:32

## 2018-02-27 NOTE — HHI.PR
Objective


Vitals





Vital Signs








  Date Time  Temp Pulse Resp B/P (MAP) Pulse Ox O2 Delivery O2 Flow Rate FiO2


 


2/27/18 00:32     95   21


 


2/27/18 00:00 97.6 74 20 117/59 (78) 96   


 


2/26/18 20:00 96.9 89 20 123/68 (86) 95   


 


2/26/18 15:37 96.3 74 20 135/61 (85) 95   


 


2/26/18 11:26 97.8 86 20 126/65 (85) 96   














I/O      


 


 2/26/18 2/26/18 2/26/18 2/27/18 2/27/18 2/27/18





 07:00 15:00 23:00 07:00 15:00 23:00


 


Intake Total   1202 ml 240 ml  


 


Output Total 600 ml   350 ml  


 


Balance -600 ml  1202 ml -110 ml  


 


      


 


Intake Oral   802 ml 240 ml  


 


IV Total   400 ml   


 


Output Urine Total 600 ml   350 ml  








Result Diagram:  


2/25/18 0835                                                                   

             2/27/18 0620








A/P


Problem List:  


(1) Cellulitis


ICD Code:  L03.90 - Cellulitis, unspecified


Status:  Acute





Problem Qualifiers





(1) Cellulitis:  


Qualified Codes:  L03.031 - Cellulitis of right toe








Geovanni Vazquez Feb 27, 2018 11:08